# Patient Record
Sex: MALE | Race: BLACK OR AFRICAN AMERICAN | NOT HISPANIC OR LATINO
[De-identification: names, ages, dates, MRNs, and addresses within clinical notes are randomized per-mention and may not be internally consistent; named-entity substitution may affect disease eponyms.]

---

## 2020-12-13 ENCOUNTER — NON-APPOINTMENT (OUTPATIENT)
Age: 35
End: 2020-12-13

## 2020-12-14 PROBLEM — Z00.00 ENCOUNTER FOR PREVENTIVE HEALTH EXAMINATION: Status: ACTIVE | Noted: 2020-12-14

## 2020-12-16 ENCOUNTER — APPOINTMENT (OUTPATIENT)
Dept: DERMATOLOGY | Facility: CLINIC | Age: 35
End: 2020-12-16
Payer: SELF-PAY

## 2020-12-16 VITALS — HEIGHT: 74 IN | BODY MASS INDEX: 25.67 KG/M2 | WEIGHT: 200 LBS

## 2020-12-16 DIAGNOSIS — Z78.9 OTHER SPECIFIED HEALTH STATUS: ICD-10-CM

## 2020-12-16 PROCEDURE — 99203 OFFICE O/P NEW LOW 30 MIN: CPT

## 2021-01-20 ENCOUNTER — APPOINTMENT (OUTPATIENT)
Dept: DERMATOLOGY | Facility: CLINIC | Age: 36
End: 2021-01-20

## 2021-03-17 ENCOUNTER — APPOINTMENT (OUTPATIENT)
Dept: DERMATOLOGY | Facility: CLINIC | Age: 36
End: 2021-03-17
Payer: MEDICAID

## 2021-03-17 PROCEDURE — 99072 ADDL SUPL MATRL&STAF TM PHE: CPT

## 2021-03-17 PROCEDURE — 11900 INJECT SKIN LESIONS </W 7: CPT

## 2021-03-17 PROCEDURE — 99213 OFFICE O/P EST LOW 20 MIN: CPT | Mod: 25

## 2021-03-17 NOTE — ASSESSMENT
[FreeTextEntry1] : 1.Seb derm, scalp\par discussed nature and course of condition\par - c/w ketoconazole shampoo 2-3x weekly, leave in 5-10 min prior to rinsing\par - c/w fluocinonide soln PRN pruritus daily for no longer than 2 weeks at a time, avoid getting near eyes, SED\par \par 2.AKN, with possible component of CCCA as well\par We have discussed the nature and usual course of these lesions.\par The risks/benefits/alternatives of intralesional injections were reviewed with the patient which include but are not limited to pain, atrophy, and dyschromia. Intralesional injections were performed in the affected area. The patient tolerated the procedure well.\par 1 mL total of ILK 5mg/mL to occipital, vertex, and parietal scalp and beard area\par \par \par RTC 4-6 weeks

## 2021-03-17 NOTE — HISTORY OF PRESENT ILLNESS
[FreeTextEntry1] : np hair loss [de-identified] : Referred by: SELF,REFERRED \par \par Mr. MALCOLM MCGRATH  is a 35 year old M here for evaluation of below\par \par patchy hair loss and tender pus bumps in scalp x 5 yrs, worse with haircuts. associated pruritus, dryness and scaling.  using BP occasionally with minimal improvement. has tried selsun blue without improvement.\par \par

## 2021-03-17 NOTE — PHYSICAL EXAM
[Alert] : alert [Oriented x 3] : ~L oriented x 3 [Well Nourished] : well nourished [Conjunctiva Non-injected] : conjunctiva non-injected [No Visual Lymphadenopathy] : no visual  lymphadenopathy [No Clubbing] : no clubbing [No Edema] : no edema [No Bromhidrosis] : no bromhidrosis [No Chromhidrosis] : no chromhidrosis [FreeTextEntry3] : scarred firm skin colored papules on occipital scalp, patchy areas of alopecia with increased follicular spacing on occipital, vertex and parietal scalp and beard area\par \par scalp with diffuse greasy scale

## 2021-12-17 ENCOUNTER — APPOINTMENT (OUTPATIENT)
Dept: DERMATOLOGY | Facility: CLINIC | Age: 36
End: 2021-12-17

## 2022-02-09 ENCOUNTER — APPOINTMENT (OUTPATIENT)
Dept: DERMATOLOGY | Facility: CLINIC | Age: 37
End: 2022-02-09
Payer: MEDICAID

## 2022-02-09 DIAGNOSIS — L21.9 SEBORRHEIC DERMATITIS, UNSPECIFIED: ICD-10-CM

## 2022-02-09 DIAGNOSIS — L70.0 ACNE VULGARIS: ICD-10-CM

## 2022-02-09 DIAGNOSIS — L73.0 ACNE KELOID: ICD-10-CM

## 2022-02-09 DIAGNOSIS — L28.0 LICHEN SIMPLEX CHRONICUS: ICD-10-CM

## 2022-02-09 PROCEDURE — 11900 INJECT SKIN LESIONS </W 7: CPT

## 2022-02-09 PROCEDURE — 99214 OFFICE O/P EST MOD 30 MIN: CPT | Mod: 25

## 2022-02-09 RX ORDER — KETOCONAZOLE 20 MG/G
2 CREAM TOPICAL
Qty: 1 | Refills: 3 | Status: ACTIVE | COMMUNITY
Start: 2022-02-09 | End: 1900-01-01

## 2022-02-09 RX ORDER — FLUOCINONIDE 0.5 MG/ML
0.05 SOLUTION TOPICAL
Qty: 1 | Refills: 2 | Status: ACTIVE | COMMUNITY
Start: 2020-12-16 | End: 1900-01-01

## 2022-02-09 RX ORDER — KETOCONAZOLE 20.5 MG/ML
2 SHAMPOO, SUSPENSION TOPICAL
Qty: 1 | Refills: 5 | Status: ACTIVE | COMMUNITY
Start: 2020-12-16 | End: 1900-01-01

## 2022-02-09 NOTE — ASSESSMENT
[FreeTextEntry1] : 1.Seb derm, scalp\par discussed nature and course of condition\par - c/w ketoconazole shampoo 2-3x weekly, leave in 5-10 min prior to rinsing\par - c/w fluocinonide soln PRN pruritus daily for no longer than 2 weeks at a time, avoid getting near eyes, SED\par -add keto cream PRN for eyebrows; SED\par \par 2.AKN, with possible component of CCCA as well\par We have discussed the nature and usual course of these lesions.\par The risks/benefits/alternatives of intralesional injections were reviewed with the patient which include but are not limited to pain, atrophy, and dyschromia. Intralesional injections were performed in the affected area. The patient tolerated the procedure well.\par 1 mL total of ILK 5mg/mL to occipital, vertex, and parietal scalp and beard area\par \par \par RTC 4-6 weeks

## 2022-02-09 NOTE — HISTORY OF PRESENT ILLNESS
[FreeTextEntry1] :  hair loss [de-identified] : Referred by: SELF,REFERRED \par \par Mr. MALCOLM MCGRATH  is a 36 year old M here for evaluation of below\par \par patchy hair loss and tender pus bumps in scalp x 5 yrs, worse with haircuts. associated pruritus, dryness and scaling.  using BP occasionally with minimal improvement. has tried selsun blue without improvement. did well with ILK.\par \par

## 2024-10-15 ENCOUNTER — APPOINTMENT (OUTPATIENT)
Dept: DERMATOLOGY | Facility: CLINIC | Age: 39
End: 2024-10-15
Payer: MEDICAID

## 2024-10-15 DIAGNOSIS — L65.9 NONSCARRING HAIR LOSS, UNSPECIFIED: ICD-10-CM

## 2024-10-15 DIAGNOSIS — L21.9 SEBORRHEIC DERMATITIS, UNSPECIFIED: ICD-10-CM

## 2024-10-15 DIAGNOSIS — L73.1 PSEUDOFOLLICULITIS BARBAE: ICD-10-CM

## 2024-10-15 DIAGNOSIS — L73.0 ACNE KELOID: ICD-10-CM

## 2024-10-15 DIAGNOSIS — L85.3 XEROSIS CUTIS: ICD-10-CM

## 2024-10-15 PROCEDURE — 11900 INJECT SKIN LESIONS </W 7: CPT

## 2024-10-15 PROCEDURE — 99214 OFFICE O/P EST MOD 30 MIN: CPT | Mod: 25

## 2024-10-15 RX ORDER — CLINDAMYCIN PHOSPHATE 10 MG/ML
1 LOTION TOPICAL DAILY
Qty: 1 | Refills: 11 | Status: ACTIVE | COMMUNITY
Start: 2024-10-15 | End: 1900-01-01

## 2025-08-16 ENCOUNTER — NON-APPOINTMENT (OUTPATIENT)
Age: 40
End: 2025-08-16